# Patient Record
Sex: FEMALE | Race: WHITE | NOT HISPANIC OR LATINO | Employment: PART TIME | URBAN - METROPOLITAN AREA
[De-identification: names, ages, dates, MRNs, and addresses within clinical notes are randomized per-mention and may not be internally consistent; named-entity substitution may affect disease eponyms.]

---

## 2020-09-19 ENCOUNTER — APPOINTMENT (EMERGENCY)
Dept: RADIOLOGY | Facility: HOSPITAL | Age: 60
End: 2020-09-19
Payer: COMMERCIAL

## 2020-09-19 ENCOUNTER — HOSPITAL ENCOUNTER (EMERGENCY)
Facility: HOSPITAL | Age: 60
Discharge: HOME | End: 2020-09-19
Attending: EMERGENCY MEDICINE | Admitting: EMERGENCY MEDICINE
Payer: COMMERCIAL

## 2020-09-19 VITALS
HEART RATE: 64 BPM | SYSTOLIC BLOOD PRESSURE: 175 MMHG | OXYGEN SATURATION: 98 % | RESPIRATION RATE: 17 BRPM | DIASTOLIC BLOOD PRESSURE: 78 MMHG | TEMPERATURE: 97.5 F | WEIGHT: 150 LBS

## 2020-09-19 DIAGNOSIS — I10 HYPERTENSION: Primary | ICD-10-CM

## 2020-09-19 LAB
ALBUMIN SERPL BCP-MCNC: 3.8 G/DL (ref 3.5–5)
ALP SERPL-CCNC: 61 U/L (ref 46–116)
ALT SERPL W P-5'-P-CCNC: 102 U/L (ref 12–78)
ANION GAP SERPL CALCULATED.3IONS-SCNC: 8 MMOL/L (ref 4–13)
AST SERPL W P-5'-P-CCNC: 113 U/L (ref 5–45)
BASOPHILS # BLD AUTO: 0.08 THOUSANDS/ΜL (ref 0–0.1)
BASOPHILS NFR BLD AUTO: 1 % (ref 0–1)
BILIRUB SERPL-MCNC: 0.2 MG/DL (ref 0.2–1)
BUN SERPL-MCNC: 24 MG/DL (ref 5–25)
CALCIUM SERPL-MCNC: 8.7 MG/DL (ref 8.3–10.1)
CHLORIDE SERPL-SCNC: 104 MMOL/L (ref 100–108)
CO2 SERPL-SCNC: 27 MMOL/L (ref 21–32)
CREAT SERPL-MCNC: 1.05 MG/DL (ref 0.6–1.3)
EOSINOPHIL # BLD AUTO: 0.23 THOUSAND/ΜL (ref 0–0.61)
EOSINOPHIL NFR BLD AUTO: 4 % (ref 0–6)
ERYTHROCYTE [DISTWIDTH] IN BLOOD BY AUTOMATED COUNT: 13.3 % (ref 11.6–15.1)
GFR SERPL CREATININE-BSD FRML MDRD: 59 ML/MIN/1.73SQ M
GLUCOSE SERPL-MCNC: 95 MG/DL (ref 65–140)
HCT VFR BLD AUTO: 36.3 % (ref 34.8–46.1)
HGB BLD-MCNC: 11.8 G/DL (ref 11.5–15.4)
IMM GRANULOCYTES # BLD AUTO: 0.02 THOUSAND/UL (ref 0–0.2)
IMM GRANULOCYTES NFR BLD AUTO: 0 % (ref 0–2)
LYMPHOCYTES # BLD AUTO: 3.01 THOUSANDS/ΜL (ref 0.6–4.47)
LYMPHOCYTES NFR BLD AUTO: 47 % (ref 14–44)
MCH RBC QN AUTO: 30.2 PG (ref 26.8–34.3)
MCHC RBC AUTO-ENTMCNC: 32.5 G/DL (ref 31.4–37.4)
MCV RBC AUTO: 93 FL (ref 82–98)
MONOCYTES # BLD AUTO: 0.54 THOUSAND/ΜL (ref 0.17–1.22)
MONOCYTES NFR BLD AUTO: 9 % (ref 4–12)
NEUTROPHILS # BLD AUTO: 2.44 THOUSANDS/ΜL (ref 1.85–7.62)
NEUTS SEG NFR BLD AUTO: 39 % (ref 43–75)
NRBC BLD AUTO-RTO: 0 /100 WBCS
PLATELET # BLD AUTO: 215 THOUSANDS/UL (ref 149–390)
PMV BLD AUTO: 10.7 FL (ref 8.9–12.7)
POTASSIUM SERPL-SCNC: 3.6 MMOL/L (ref 3.5–5.3)
PROT SERPL-MCNC: 7.1 G/DL (ref 6.4–8.2)
RBC # BLD AUTO: 3.91 MILLION/UL (ref 3.81–5.12)
SODIUM SERPL-SCNC: 139 MMOL/L (ref 136–145)
TROPONIN I SERPL-MCNC: <0.02 NG/ML
WBC # BLD AUTO: 6.32 THOUSAND/UL (ref 4.31–10.16)

## 2020-09-19 PROCEDURE — 96374 THER/PROPH/DIAG INJ IV PUSH: CPT

## 2020-09-19 PROCEDURE — 36415 COLL VENOUS BLD VENIPUNCTURE: CPT | Performed by: EMERGENCY MEDICINE

## 2020-09-19 PROCEDURE — 99284 EMERGENCY DEPT VISIT MOD MDM: CPT

## 2020-09-19 PROCEDURE — 84484 ASSAY OF TROPONIN QUANT: CPT | Performed by: EMERGENCY MEDICINE

## 2020-09-19 PROCEDURE — 70498 CT ANGIOGRAPHY NECK: CPT

## 2020-09-19 PROCEDURE — G1004 CDSM NDSC: HCPCS

## 2020-09-19 PROCEDURE — 71045 X-RAY EXAM CHEST 1 VIEW: CPT

## 2020-09-19 PROCEDURE — 99285 EMERGENCY DEPT VISIT HI MDM: CPT | Performed by: EMERGENCY MEDICINE

## 2020-09-19 PROCEDURE — 80053 COMPREHEN METABOLIC PANEL: CPT | Performed by: EMERGENCY MEDICINE

## 2020-09-19 PROCEDURE — 70496 CT ANGIOGRAPHY HEAD: CPT

## 2020-09-19 PROCEDURE — 85025 COMPLETE CBC W/AUTO DIFF WBC: CPT | Performed by: EMERGENCY MEDICINE

## 2020-09-19 PROCEDURE — 93005 ELECTROCARDIOGRAM TRACING: CPT

## 2020-09-19 RX ORDER — HYDRALAZINE HYDROCHLORIDE 20 MG/ML
5 INJECTION INTRAMUSCULAR; INTRAVENOUS ONCE
Status: COMPLETED | OUTPATIENT
Start: 2020-09-19 | End: 2020-09-19

## 2020-09-19 RX ORDER — VENLAFAXINE HYDROCHLORIDE 37.5 MG/1
37.5 CAPSULE, EXTENDED RELEASE ORAL DAILY
COMMUNITY

## 2020-09-19 RX ORDER — CLONIDINE HYDROCHLORIDE 0.1 MG/1
0.2 TABLET ORAL ONCE
Status: COMPLETED | OUTPATIENT
Start: 2020-09-19 | End: 2020-09-19

## 2020-09-19 RX ORDER — CLONIDINE HYDROCHLORIDE 0.1 MG/1
0.2 TABLET ORAL ONCE
Status: DISCONTINUED | OUTPATIENT
Start: 2020-09-19 | End: 2020-09-19

## 2020-09-19 RX ORDER — ATENOLOL 25 MG/1
25 TABLET ORAL DAILY
COMMUNITY

## 2020-09-19 RX ADMIN — CLONIDINE HYDROCHLORIDE 0.2 MG: 0.1 TABLET ORAL at 05:02

## 2020-09-19 RX ADMIN — IOHEXOL 85 ML: 350 INJECTION, SOLUTION INTRAVENOUS at 01:46

## 2020-09-19 RX ADMIN — HYDRALAZINE HYDROCHLORIDE 5 MG: 20 INJECTION INTRAMUSCULAR; INTRAVENOUS at 03:23

## 2020-09-19 NOTE — ED PROVIDER NOTES
History  Chief Complaint   Patient presents with    Hypertension     pt c/o high blood pressure and nausea starting today @11PM  Pt took bp an hour ago and it was 213/107  Pt just flew in from [place] yesterday and is visiting family here  Pt denies any other complaints  HPI    61-year-old female that presents with hypertension  Patient states around 11:00 p m  Tonight she started noticing her blood pressure went up to 367 systolic  Patient states she was diagnosed with HTN which was evaluated at her hometown in [place]  She had a CT scan along with blood work and EKG  Patient was discharged home she was placed on atenolol  Her PCP discontinued atenolol once her pressures came down  Intermittently it keeps going up  Tonight she checked her pressure and it was 111 systolic  NO headache at the time  Denies any vision changes no chest pain shortness of breath  64 year female who presents of hypertension  Patient is well appearing exam   We will do a CTA  Previous skin she states was a CT head which did not show any blood  Will get some basic blood work to check for renal function  Prior to Admission Medications   Prescriptions Last Dose Informant Patient Reported? Taking?   atenolol (TENORMIN) 25 mg tablet 9/18/2020 at Unknown time Self Yes Yes   Sig: Take 25 mg by mouth daily   venlafaxine (EFFEXOR-XR) 37 5 mg 24 hr capsule 9/18/2020 at Unknown time Self Yes Yes   Sig: Take 37 5 mg by mouth daily Every other day      Facility-Administered Medications: None       Past Medical History:   Diagnosis Date    Hypertension        Past Surgical History:   Procedure Laterality Date    HYSTERECTOMY         History reviewed  No pertinent family history  I have reviewed and agree with the history as documented      E-Cigarette/Vaping     E-Cigarette/Vaping Substances     Social History     Tobacco Use    Smoking status: Never Smoker    Smokeless tobacco: Never Used   Substance Use Topics    Alcohol use: Never     Frequency: Never    Drug use: Never       Review of Systems   Constitutional: Negative  Negative for diaphoresis and fever  HENT: Negative  Respiratory: Negative  Negative for cough, shortness of breath and wheezing  Cardiovascular: Negative  Negative for chest pain, palpitations and leg swelling  Gastrointestinal: Negative for abdominal distention, abdominal pain, nausea and vomiting  Genitourinary: Negative  Musculoskeletal: Negative  Skin: Negative  Neurological: Positive for headaches  Psychiatric/Behavioral: Negative  All other systems reviewed and are negative  Physical Exam  Physical Exam  Vitals signs reviewed  Constitutional:       Appearance: Normal appearance  She is well-developed  HENT:      Head: Normocephalic and atraumatic  Eyes:      Pupils: Pupils are equal, round, and reactive to light  Neck:      Musculoskeletal: Normal range of motion and neck supple  Cardiovascular:      Rate and Rhythm: Normal rate and regular rhythm  Heart sounds: Normal heart sounds  No murmur  Pulmonary:      Effort: Pulmonary effort is normal       Breath sounds: Normal breath sounds  Abdominal:      General: Bowel sounds are normal  There is no distension  Palpations: Abdomen is soft  Tenderness: There is no abdominal tenderness  There is no guarding or rebound  Musculoskeletal: Normal range of motion  Skin:     General: Skin is warm and dry  Capillary Refill: Capillary refill takes less than 2 seconds  Neurological:      Mental Status: She is alert and oriented to person, place, and time     Psychiatric:         Mood and Affect: Mood normal          Vital Signs  ED Triage Vitals [09/19/20 0024]   Temperature Pulse Respirations Blood Pressure SpO2   97 5 °F (36 4 °C) 66 16 (!) 224/105 99 %      Temp Source Heart Rate Source Patient Position - Orthostatic VS BP Location FiO2 (%)   Tympanic Monitor Lying Right arm --      Pain Score --           Vitals:    09/19/20 0352 09/19/20 0412 09/19/20 0443 09/19/20 0530   BP: (!) 191/88 (!) 182/81 (!) 190/88 (!) 175/78   Pulse: 63 58 59 64   Patient Position - Orthostatic VS: Lying Sitting Sitting          Visual Acuity      ED Medications  Medications   iohexol (OMNIPAQUE) 350 MG/ML injection (SINGLE-DOSE) 100 mL (85 mL Intravenous Given 9/19/20 0146)   hydrALAZINE (APRESOLINE) injection 5 mg (5 mg Intravenous Given 9/19/20 0323)   cloNIDine (CATAPRES) tablet 0 2 mg (0 2 mg Oral Given 9/19/20 0502)       Diagnostic Studies  Results Reviewed     Procedure Component Value Units Date/Time    Comprehensive metabolic panel [[id number]]  (Abnormal) Collected:  09/19/20 0040    Lab Status:  Final result Specimen:  Blood from Arm, Left Updated:  09/19/20 0108     Sodium 139 mmol/L      Potassium 3 6 mmol/L      Chloride 104 mmol/L      CO2 27 mmol/L      ANION GAP 8 mmol/L      BUN 24 mg/dL      Creatinine 1 05 mg/dL      Glucose 95 mg/dL      Calcium 8 7 mg/dL       U/L       U/L      Alkaline Phosphatase 61 U/L      Total Protein 7 1 g/dL      Albumin 3 8 g/dL      Total Bilirubin 0 20 mg/dL      eGFR 59 ml/min/1 73sq m     Narrative:       [facility] guidelines for Chronic Kidney Disease (CKD):     Stage 1 with normal or high GFR (GFR > 90 mL/min/1 73 square meters)    Stage 2 Mild CKD (GFR = 60-89 mL/min/1 73 square meters)    Stage 3A Moderate CKD (GFR = 45-59 mL/min/1 73 square meters)    Stage 3B Moderate CKD (GFR = 30-44 mL/min/1 73 square meters)    Stage 4 Severe CKD (GFR = 15-29 mL/min/1 73 square meters)    Stage 5 End Stage CKD (GFR <15 mL/min/1 73 square meters)  Note: GFR calculation is accurate only with a steady state creatinine    Troponin I [[id number]]  (Normal) Collected:  09/19/20 0040    Lab Status:  Final result Specimen:  Blood from Arm, Left Updated:  09/19/20 0104     Troponin I <0 02 ng/mL     CBC and differential [[id number]]  (Abnormal) Collected:  09/19/20 0040    Lab Status:  Final result Specimen:  Blood from Arm, Left Updated:  09/19/20 0044     WBC 6 32 Thousand/uL      RBC 3 91 Million/uL      Hemoglobin 11 8 g/dL      Hematocrit 36 3 %      MCV 93 fL      MCH 30 2 pg      MCHC 32 5 g/dL      RDW 13 3 %      MPV 10 7 fL      Platelets 061 Thousands/uL      nRBC 0 /100 WBCs      Neutrophils Relative 39 %      Immat GRANS % 0 %      Lymphocytes Relative 47 %      Monocytes Relative 9 %      Eosinophils Relative 4 %      Basophils Relative 1 %      Neutrophils Absolute 2 44 Thousands/µL      Immature Grans Absolute 0 02 Thousand/uL      Lymphocytes Absolute 3 01 Thousands/µL      Monocytes Absolute 0 54 Thousand/µL      Eosinophils Absolute 0 23 Thousand/µL      Basophils Absolute 0 08 Thousands/µL                  CTA head and neck with and without contrast   Final Result by Verenice Archer MD (09/19 0201)      No acute intracranial hemorrhage, midline shift, or mass effect  No hemodynamically significant stenosis of the arteries of the neck  No high-grade proximal stenosis of the visualized [place]  No significant intracranial arteriovenous malformation or aneurysm  Workstation performed: [id number]         XR chest 1 view portable   Final Result by Ambar Venegas MD (09/19 1450)      No acute cardiopulmonary disease  Workstation performed: [id number]                    Procedures  Procedures         ED Course  ED Course as of Sep 19 2244   Sat Sep 19, 2020   0117 Procedure Note: EKG  Date/Time: 09/19/20 1:17 AM   Performed by: Óscar Malcolm   Authorized by: Óscar Malcolm   Indications / Diagnosis: HTN  ECG reviewed by me, the ED Provider: yes   The EKG demonstrates:  Rhythm: normal sinus  Intervals: normal intervals  Axis: normal axis  QRS/Blocks: normal QRS  ST Changes: No acute ST Changes, no STD/MARTY         0225 Patient's symptoms improved  Her blood pressure is coming down slowly    Patient states she has atenolol which will take at home  Will discharge patient home with appropriate return precautions  0059 Patient has atentolol with her  which she states she will currently take since elevated pressure       0533 Had a lengthly discussion with patient regarding speaking with her PCP regarding blood pressure management  [facility] was advised to return if any symptoms with HTN  Currently asymaptomatic  Pressures improved from arrival  Will discharge  MDM    Disposition  Final diagnoses:   Hypertension     Time reflects when diagnosis was documented in both MDM as applicable and the Disposition within this note     Time User Action Codes Description Comment    9/19/2020  2:25 AM Nicci Nino 50 Hypertension       ED Disposition     ED Disposition Condition Date/Time Comment    Discharge Stable Sat Sep 19, 2020  2:25 AM Meredith Merrill discharge to home/self care  Follow-up Information     Follow up With Specialties Details Why [address]  Schedule an appointment as soon as possible for a visit   [phone]          Discharge Medication List as of 9/19/2020  5:37 AM      CONTINUE these medications which have NOT CHANGED    Details   atenolol (TENORMIN) 25 mg tablet Take 25 mg by mouth daily, Historical Med      venlafaxine (EFFEXOR-XR) 37 5 mg 24 hr capsule Take 37 5 mg by mouth daily Every other day, Historical Med           No discharge procedures on file      PDMP Review     None          ED Provider  Electronically Signed by           Cookie Forrester MD  09/19/20 0800

## 2020-09-19 NOTE — ED NOTES
Pt's /93, Dr Brown Sas aware, will continue to watch then reasses in 30 min        [address], RN  09/19/20 9990

## 2020-09-19 NOTE — DISCHARGE INSTRUCTIONS
Please return if you have chest pain, shortness of breath, severe headaches and hypertension  Please follow-up with the family doctor

## 2020-09-19 NOTE — ED NOTES
Per Dr Jennifer Mitchell, Pt may take 1 of her atenolol, then recheck BP in 15 min       [facility], [address] [place] [address]  09/19/20 8083

## 2020-09-19 NOTE — ED NOTES
Dr Dami Blue updated on pt's current vital signs, orders to follow       Cynthia Evans RN  09/19/20 8370

## 2020-09-23 LAB
ATRIAL RATE: 68 BPM
P AXIS: 47 DEGREES
PR INTERVAL: 128 MS
QRS AXIS: 9 DEGREES
QRSD INTERVAL: 78 MS
QT INTERVAL: 404 MS
QTC INTERVAL: 429 MS
T WAVE AXIS: 21 DEGREES
VENTRICULAR RATE: 68 BPM

## 2020-09-23 PROCEDURE — 93010 ELECTROCARDIOGRAM REPORT: CPT | Performed by: INTERNAL MEDICINE
